# Patient Record
Sex: FEMALE | Race: WHITE
[De-identification: names, ages, dates, MRNs, and addresses within clinical notes are randomized per-mention and may not be internally consistent; named-entity substitution may affect disease eponyms.]

---

## 2018-04-13 ENCOUNTER — HOSPITAL ENCOUNTER (OUTPATIENT)
Dept: HOSPITAL 96 - M.ULTRA | Age: 78
End: 2018-04-13
Attending: NURSE PRACTITIONER
Payer: MEDICARE

## 2018-04-13 DIAGNOSIS — G45.9: Primary | ICD-10-CM

## 2018-04-13 DIAGNOSIS — I10: ICD-10-CM

## 2018-04-13 DIAGNOSIS — E78.00: ICD-10-CM

## 2018-09-24 ENCOUNTER — HOSPITAL ENCOUNTER (OUTPATIENT)
Dept: HOSPITAL 96 - M.MRI | Age: 78
End: 2018-09-24
Attending: NURSE PRACTITIONER
Payer: MEDICARE

## 2018-09-24 DIAGNOSIS — I10: ICD-10-CM

## 2018-09-24 DIAGNOSIS — M51.26: Primary | ICD-10-CM

## 2018-09-24 DIAGNOSIS — M51.17: ICD-10-CM

## 2019-09-16 ENCOUNTER — HOSPITAL ENCOUNTER (OUTPATIENT)
Dept: HOSPITAL 96 - M.CRD | Age: 79
End: 2019-09-16
Attending: NURSE PRACTITIONER
Payer: COMMERCIAL

## 2019-09-16 DIAGNOSIS — I08.8: Primary | ICD-10-CM

## 2019-09-16 NOTE — 2DMMODE
[place] [address]
Phone:  [phone] 2 D/M-MODE ECHOCARDIOGRAM     
_______________________________________________________________________________
 
Name:         MELODY MARTINEZ                Room:                     REG CLI
M.R.#:    [id number]     Account #:     [id number]  
Admission:    19    Attend Phys:   Suzette Garcia
Discharge:                Date of Birth: [birth year]  
Date of Service: 19 1254  Report #:      [id number]
        [id number]
_______________________________________________________________________________
THIS REPORT FOR:  //name//                      
 
 
--------------- APPROVED REPORT --------------
 
 
Study performed:  2019 10:43:50
 
EXAM: Comprehensive 2D, Doppler, and color-flow 
Echocardiogram 
Patient Location: Out-Patient   
 
      BSA:         1.66
HR: 84 bpm BP:          120/52 mmHg 
 
Other Information 
Study Quality: Good
 
Indications
Arrhythmia
 
2D Dimensions
IVSd:  9.30 (7-11mm) LVOT Diam:  19.60 (18-24mm) 
LVDd:  39.57 mm  
PWd:  8.19 (7-11mm) Ascending Ao:  22.47 (22-36mm)
LVDs:  24.15 (25-40mm) 
Aortic Root:  20.24 mm 
 
Volumes
Left Atrial Volume (Systole) 
    LA ESV Index:  18.50 mL/m2
 
Aortic Valve
AoV Peak Olaf.:  2.20 m/s 
AO Peak Gr.:  19.43 mmHg LVOT Max PG:  3.47 mmHg
AO Mean Gr.:  10.52 mmHg LVOT Mean P.71 mmHg
    LVOT Max V:  0.93 m/s
AO V2 VTI:  45.12 cm  LVOT Mean V:  0.60 m/s
RAIZA (VTI):  1.97 cm2  LVOT V1 VTI:  29.51 cm
AI [place]:  1.97 m/s2  
AI PHT:  466.27 ms  
 
Mitral Valve
    E/A Ratio:  1.34
    MV Decel. Time:  188.55 ms
MV E Max Olaf.:  1.05 m/s 
 
 
[place] [address]
Phone:  [phone]                     2 D/M-MODE ECHOCARDIOGRAM     
_______________________________________________________________________________
 
Name:         MELODY MARTINEZ                Room:                     REG CLI
M.R.#:    [id number]     Account #:     [id number]  
Admission:    19    Attend Phys:   Suzette Garcia
Discharge:                Date of Birth: [birth year]  
Date of Service: 19 1254  Report #:      [id number]
        [id number]
_______________________________________________________________________________
MV PHT:  54.68 ms  
MVA (PHT):  4.02 cm2  
 
TDI
E/Lateral E':  10.50 E/Medial E':  9.55
   Medial E' Olaf.:  0.11 m/s
   Lateral E' Olaf.:  0.10 m/s
 
Pulmonary Valve
PV Peak Olaf.:  1.12 m/s PV Peak Gr.:  4.98 mmHg
 
Tricuspid Valve
    RAP Estimate:  5.00 mmHg
TR Peak Gr.:  33.94 mmHg RVSP:  38.94 mmHg
    PA Pressure:  38.94 mmHg
 
Left Ventricle
The left ventricle is normal size. There is normal LV segmental wall 
motion. There is normal left ventricular wall thickness. Left 
ventricular systolic function is normal. The left ventricular 
ejection fraction is within the normal range. LVEF is 65-70%. The 
left ventricular diastolic function is normal.
 
Right Ventricle
The right ventricle is normal size. The right ventricular systolic 
function is normal.
 
Atria
The left atrium size is normal. The right atrium size is 
normal.
 
Aortic Valve
Aortic valve is mildly calcified. Moderate aortic regurgitation. 
There is no aortic valvular stenosis.
 
Mitral Valve
The mitral valve is normal in structure. Moderate mitral 
regurgitation. No evidence of mitral valve stenosis.
 
Tricuspid Valve
The tricuspid valve is normal in structure. Mild tricuspid 
regurgitation. estimated pa pressure 45 mm hg
 
Pulmonic Valve
The pulmonary valve is normal in structure. Mild pulmonic 
regurgitation.
 
 
[place] [address]
Phone:  [phone]                     2 D/M-MODE ECHOCARDIOGRAM     
_______________________________________________________________________________
 
Name:         MELODY MARTINEZ                Room:                     REG CLI
M.R.#:    [id number]     Account #:     [id number]  
Admission:    19    Attend Phys:   Suzette Garcia
Discharge:                Date of Birth: [birth year]  
Date of Service: 19 1254  Report #:      [id number]
        [id number]
_______________________________________________________________________________
 
Great Vessels
The aortic root is normal in size. IVC is normal in size and 
collapses >50% with inspiration.
 
Pericardium
There is no pericardial effusion.
 
<Conclusion>
LVEF is 65-70%.
Moderate aortic regurgitation.
Moderate mitral regurgitation.
Mild tricuspid regurgitation.
estimated pa pressure 45 mm hg
 
 
 
 
 
 
 
 
 
 
 
 
 
 
 
 
 
 
 
 
 
 
 
 
 
 
 
 
 
 
  <ELECTRONICALLY SIGNED>
                                           By: Reuben Oates MD, Merged with [facility]      
  19     1254
D: 19 1254   _____________________________________
T: 19 1254   Reuben Oates MD, FAC        /INF